# Patient Record
Sex: MALE | Race: WHITE | Employment: FULL TIME
[De-identification: names, ages, dates, MRNs, and addresses within clinical notes are randomized per-mention and may not be internally consistent; named-entity substitution may affect disease eponyms.]

---

## 2022-03-19 PROBLEM — K37 APPENDICITIS: Status: ACTIVE | Noted: 2018-03-05

## 2023-06-19 ENCOUNTER — HOSPITAL ENCOUNTER (OUTPATIENT)
Facility: HOSPITAL | Age: 28
Setting detail: RECURRING SERIES
Discharge: HOME | End: 2023-06-22
Payer: COMMERCIAL

## 2023-06-19 PROCEDURE — 97535 SELF CARE MNGMENT TRAINING: CPT

## 2023-06-19 PROCEDURE — 97161 PT EVAL LOW COMPLEX 20 MIN: CPT

## 2023-06-19 NOTE — PROGRESS NOTES
PHYSICAL / OCCUPATIONAL THERAPY - DAILY TREATMENT NOTE (updated )  For Eval visit    Patient Name: Blanca Peacock    Date: 2023    : 1995  Insurance: Payor: Suhas Lam / Plan: Benedict Leigh / Product Type: *No Product type* /      Patient  verified yes     Visit #   Current / Total 1 6-12   Time   In / Out 4:20 5:05   Pain   In / Out 10 210   Subjective Functional Status/Changes: See POC     TREATMENT AREA =  see POC    OBJECTIVE           35 min   Eval - untimed                      Therapeutic Procedures: Tx Min Billable or 1:1 Min (if diff from Tx Min) Procedure, Rationale, Specifics   10 10 21279 Self Care/Home Management (timed):  improve patient knowledge and understanding of pain reducing techniques, activity modification, diagnosis/prognosis, and physical therapy expectations, procedures and progression  to improve patient's ability to progress to PLOF and address remaining functional goals. (see flow sheet as applicable)     Details if applicable:  see chart copy for HEP.              Details if applicable:            Details if applicable:            Details if applicable:            Details if applicable:     10 10 [facility] Totals Reminder: bill using total billable min of TIMED therapeutic procedures (example: do not include dry needle or estim unattended, both untimed codes, in totals to left)  8-22 min = 1 unit; 23-37 min = 2 units; 38-52 min = 3 units; 53-67 min = 4 units; 68-82 min = 5 units   Total Total     [x]  Patient Education billed concurrently with other procedures   [x] Review HEP    [] Progressed/Changed HEP, detail:    [] Other detail:       Objective Information/Functional Measures/Assessment    See POC    Patient will continue to benefit from skilled PT / OT services to modify and progress therapeutic interventions, analyze and address functional mobility deficits, analyze and address ROM deficits, analyze and address strength deficits, analyze and address soft

## 2023-06-19 NOTE — THERAPY EVALUATION
[address] [place] [address] PHYSICAL THERAPY  [facility] [address], [place], [address]  Phone: [phone]   [phone]  Plan of Care / Statement of Necessity for Physical Therapy Services     Patient Name: Lyla Vides : 1995   Medical   Diagnosis: Left shoulder pain [M25.512] Treatment Diagnosis:  M25.512  LEFT SHOULDER PAIN    Onset Date: 2023     Referral Source: Jacob Moreland PA-C Start of Care [facility]): 2023   Prior Hospitalization: See medical history Provider #: [id number]   Prior Level of Function: RHD; previously able to lift 3-4x/week free weights and machines   Comorbidities: Appendectomy 2018; back pain     Assessment / key information: Pt is a 24 y/o M who presents to PT w/ c/o L shoulder pain, that has been present for about 2 month occurring the day after heavy bench pressing. Pt notes pain ranges 2 to 10/10, made worse with reaching behind back, laying on the L side, pressing/pushing, carrying groceries; better with laying on the back. Pt does endorse some night pain that wakes him up out of a sleep, as well as instability of the L shoulder. Describes pain as aching, sharp, located superior aspect of the L shoulder and occasionally along the anterior aspect of the shoulder. Testing/imaging has included x-ray- unremarkable per pt report. Prior treatment has included nothing to date. . Red flags negative. FOTO 64/100. Clinical Exam Findings:  POSTURE/OBSERVATION: mild L GHJ IR, medial border scap winging on the L with eccentric flexion. C/s AROM mildly limited with R rotation other WNL and pain-free   STRENGTH AROM   Shoulder Left Right Left  (Seated) Right  (Seated)   Flexion 4/5 4+/5 155 156   Extension   48 50   Abduction 4/5 p! 4/5 160 170   ER (scap plane)  4+/5  4/5 60 p! 90   IR (scap plane)  4/5  4+/5 FIR T10 p!  FIR T7   Elbow Left Right Left Right   Extension 4/5 4/5     Flexion 4/5 p! 4/5     Scapular Left Right Left Right   Mid trap 4-/5 4-/5

## 2023-07-05 ENCOUNTER — HOSPITAL ENCOUNTER (OUTPATIENT)
Facility: HOSPITAL | Age: 28
Setting detail: RECURRING SERIES
Discharge: HOME | End: 2023-07-08
Payer: COMMERCIAL

## 2023-07-05 PROCEDURE — 97110 THERAPEUTIC EXERCISES: CPT

## 2023-07-05 PROCEDURE — 97140 MANUAL THERAPY 1/> REGIONS: CPT

## 2023-07-05 NOTE — PROGRESS NOTES
Therapeutic Procedures: Tx Min Billable or 1:1 Min (if diff from Tx Min) Procedure, Rationale, Specifics   26 26 59186 Therapeutic Exercise (timed):  increase ROM, strength, coordination, balance, and proprioception to improve patient's ability to progress to PLOF and address remaining functional goals. (see flow sheet as applicable)     Details if applicable:       10 10 82954 Manual Therapy (timed):  decrease pain, increase ROM, increase tissue extensibility, and decrease trigger points to improve patient's ability to progress to PLOF and address remaining functional goals. The manual therapy interventions were performed at a separate and distinct time from the therapeutic activities interventions . (see flow sheet as applicable)     Details if applicable:  TPR to L infraspinatus, CFM to proximal LHB, inf ghj mob gr IV          Details if applicable:            Details if applicable:     38 39  BC Totals Reminder: bill using total billable min of TIMED therapeutic procedures (example: do not include dry needle or estim unattended, both untimed codes, in totals to left)  8-22 min = 1 unit; 23-37 min = 2 units; 38-52 min = 3 units; 53-67 min = 4 units; 68-82 min = 5 units   Total Total     [x]  Patient Education billed concurrently with other procedures   [x] Review HEP    [] Progressed/Changed HEP, detail:    [] Other detail:       Objective Information/Functional Measures/Assessment    Initiated tx as per flow to improve L shoulder strength/stability. Large TrP present to L infraspinatus with (+) referral to anterior shoulder. Pt challenged with lower trap lifts and posterior RC recruitment. Reviewed HEP and appropriate gym workouts while undergoing PT. Pt verbalized understanding.      Patient will continue to benefit from skilled PT / OT services to modify and progress therapeutic interventions, analyze and address functional mobility deficits, analyze and address ROM deficits, analyze and address

## 2023-07-10 ENCOUNTER — APPOINTMENT (OUTPATIENT)
Facility: HOSPITAL | Age: 28
End: 2023-07-10
Payer: COMMERCIAL

## 2023-07-14 ENCOUNTER — HOSPITAL ENCOUNTER (OUTPATIENT)
Facility: HOSPITAL | Age: 28
Setting detail: RECURRING SERIES
Discharge: HOME | End: 2023-07-17
Payer: COMMERCIAL

## 2023-07-14 PROCEDURE — 97140 MANUAL THERAPY 1/> REGIONS: CPT

## 2023-07-14 PROCEDURE — 97110 THERAPEUTIC EXERCISES: CPT

## 2023-07-14 PROCEDURE — 97112 NEUROMUSCULAR REEDUCATION: CPT

## 2023-07-19 ENCOUNTER — HOSPITAL ENCOUNTER (OUTPATIENT)
Facility: HOSPITAL | Age: 28
Setting detail: RECURRING SERIES
Discharge: HOME | End: 2023-07-22
Payer: COMMERCIAL

## 2023-07-19 PROCEDURE — 97112 NEUROMUSCULAR REEDUCATION: CPT

## 2023-07-19 PROCEDURE — 97110 THERAPEUTIC EXERCISES: CPT

## 2023-07-19 PROCEDURE — 97140 MANUAL THERAPY 1/> REGIONS: CPT

## 2023-07-19 NOTE — PROGRESS NOTES
with 20# for similar goals, patient completed with appropriate scapular mechanics. Patient performance in session indicates good follow through with independent exercises. Patient will continue to benefit from skilled PT / OT services to modify and progress therapeutic interventions, analyze and address functional mobility deficits, analyze and address ROM deficits, analyze and address strength deficits, analyze and address soft tissue restrictions, analyze and cue for proper movement patterns, and analyze and modify for postural abnormalities to address functional deficits and attain remaining goals. Progress toward goals / Updated goals:  []  See Progress Note/Recertification    New Goals to be achieved in __4__ weeks  1. Patient will improve L lower trap strength to 4/5 in order to improve tolerance to weight lifting activities. (update 7/19/23): added 2# weight to prone Y to address goal, patient completed with good form indicating improved strength from IE. 2.   Pt will perform 5 reps body weight push up without pain to enable pt return to PLOF  3.   Improve FOTO score to >/= 78/100 to indicate improved function       Next PN/ RC due 8/14/23  Auth due 1/1/24    PLAN  Yes  Continue plan of care  [x]  Upgrade activities as tolerated  []  Discharge due to :  []  Other:    Alfonso Molina, PT    7/19/2023    5:43 PM    Future Appointments   Date Time Provider [id number]   7/26/2023  4:20 PM Corina Silva PT [facility]

## 2023-07-20 ENCOUNTER — APPOINTMENT (OUTPATIENT)
Facility: HOSPITAL | Age: 28
End: 2023-07-20
Payer: COMMERCIAL

## 2023-07-26 ENCOUNTER — APPOINTMENT (OUTPATIENT)
Facility: HOSPITAL | Age: 28
End: 2023-07-26
Payer: COMMERCIAL

## 2023-07-26 ENCOUNTER — HOSPITAL ENCOUNTER (OUTPATIENT)
Facility: HOSPITAL | Age: 28
Setting detail: RECURRING SERIES
Discharge: HOME | End: 2023-07-29
Payer: COMMERCIAL

## 2023-07-26 PROCEDURE — 97112 NEUROMUSCULAR REEDUCATION: CPT

## 2023-07-26 PROCEDURE — 97110 THERAPEUTIC EXERCISES: CPT

## 2023-07-26 PROCEDURE — 97140 MANUAL THERAPY 1/> REGIONS: CPT

## 2023-07-26 NOTE — PROGRESS NOTES
added core challenge  -progressed prone eccentric ER to prone ER ball toss for improved dynamic stabilization    Pt progressing steadily with PT, reporting good improvement and tolerance to light gym related activity. Plan to continue to progress dynamic stabilization of the L shoulder, gradually introducing weight training as tolerated. Patient will continue to benefit from skilled PT / OT services to modify and progress therapeutic interventions, analyze and address functional mobility deficits, analyze and address ROM deficits, analyze and address strength deficits, analyze and address soft tissue restrictions, analyze and cue for proper movement patterns, and analyze and modify for postural abnormalities to address functional deficits and attain remaining goals. Progress toward goals / Updated goals:  []  See Progress Note/Recertification    New Goals to be achieved in __4__ weeks  1. Patient will improve L lower trap strength to 4/5 in order to improve tolerance to weight lifting activities. (update 7/19/23): added 2# weight to prone Y to address goal, patient completed with good form indicating improved strength from IE. 2.   Pt will perform 5 reps body weight push up without pain to enable pt return to PLOF 7/26/23: progressed to contralateral shoulder tap in modified plank position; plan to progress to table or modified push ups at NV  3. Improve FOTO score to >/= 78/100 to indicate improved function       Next PN/ RC due 8/14/23  Auth due PRISCILA (30 v exp 1/1/24)    PLAN  Yes  Continue plan of care  [x]  Upgrade activities as tolerated  []  Discharge due to :  []  Other:    Newton Schumacher, PT    7/26/2023    4:37 PM    No future appointments.

## 2023-08-02 ENCOUNTER — APPOINTMENT (OUTPATIENT)
Facility: HOSPITAL | Age: 28
End: 2023-08-02
Payer: COMMERCIAL

## 2023-08-03 ENCOUNTER — HOSPITAL ENCOUNTER (OUTPATIENT)
Facility: HOSPITAL | Age: 28
Setting detail: RECURRING SERIES
Discharge: HOME | End: 2023-08-06
Payer: COMMERCIAL

## 2023-08-03 PROCEDURE — 97110 THERAPEUTIC EXERCISES: CPT

## 2023-08-03 PROCEDURE — 97112 NEUROMUSCULAR REEDUCATION: CPT

## 2023-08-03 PROCEDURE — 97140 MANUAL THERAPY 1/> REGIONS: CPT

## 2023-08-03 NOTE — PROGRESS NOTES
PHYSICAL / OCCUPATIONAL THERAPY - DAILY TREATMENT NOTE (updated )    Patient Name: Partha Mascorro    Date: 8/3/2023    : 1995  Insurance: Payor: Elba Garcia / Plan: Yuridia Smith / Product Type: *No Product type* /      Patient  verified Yes     Visit #   Current / Total 6 20   Time   In / Out 1100 1152   Pain   In / Out 5/10 2/10   Subjective Functional Status/Changes: Patient reports he had softball last night and didn't experience any symptoms while playing. 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 TREATMENT AREA =  Left shoulder pain [M25.512]    OBJECTIVE    Modalities Rationale:     decrease inflammation and decrease pain to improve patient's ability to progress to PLOF and address remaining functional goals. min [] Estim Unattended, type/location:                                      []  w/ice    []  w/heat    min [] Estim Attended, type/location:                                     []  w/US     []  w/ice    []  w/heat    []  TENS insruct      min []  Mechanical Traction: type/lbs                   []  pro   []  sup   []  int   []  cont    []  before manual    []  after manual    min []  Ultrasound, settings/location:      min []  Iontophoresis w/ dexamethasone, location:                                               []  take home patch       []  in clinic   10 min  unbill [x]  Ice     []  Heat    location/position: To L shoulder in seated position    min []  Paraffin,  details:     min []  Vasopneumatic Device, press/temp:     min []  Sri Lemos / Homa Acre:     If using vaso (only need to measure limb vaso being performed on)      pre-treatment girth :       post-treatment girth :       measured at (landmark location) :      min []  Other:    Skin assessment post-treatment (if applicable):    [x]  intact    []  redness- no adverse reaction                 []redness - adverse reaction:         Therapeutic

## 2023-08-08 ENCOUNTER — HOSPITAL ENCOUNTER (OUTPATIENT)
Facility: HOSPITAL | Age: 28
Setting detail: RECURRING SERIES
Discharge: HOME | End: 2023-08-11
Payer: COMMERCIAL

## 2023-08-08 PROCEDURE — 97530 THERAPEUTIC ACTIVITIES: CPT

## 2023-08-08 PROCEDURE — 97112 NEUROMUSCULAR REEDUCATION: CPT

## 2023-08-08 PROCEDURE — 97110 THERAPEUTIC EXERCISES: CPT

## 2023-08-08 NOTE — PROGRESS NOTES
PHYSICAL / OCCUPATIONAL THERAPY - DAILY TREATMENT NOTE (updated )    Patient Name: Radha Medeiros    Date: 2023    : 1995  Ins[facility]: [facility] [address] / Plan: Beatriz Paling / Product Type: *No Product type* /      Patient  verified Yes     Visit #   Current / Total 7 20   Time   In / Out 5:41 6:30   Pain   In / Out 2 0   Subjective Functional Status/Changes: See PN     TREATMENT AREA =  Left shoulder pain [M25.512]    OBJECTIVE    Modalities Rationale:     decrease inflammation and decrease pain to improve patient's ability to progress to PLOF and address remaining functional goals. min [] Estim Unattended, type/location:                                      []  w/ice    []  w/heat    min [] Estim Attended, type/location:                                     []  w/US     []  w/ice    []  w/heat    []  TENS insruct      min []  Mechanical Traction: type/lbs                   []  pro   []  sup   []  int   []  cont    []  before manual    []  after manual    min []  Ultrasound, settings/location:      min []  Iontophoresis w/ dexamethasone, location:                                               []  take home patch       []  in clinic   10 min  unbill [x]  Ice     []  Heat    location/position: To L shoulder in seated position    min []  Paraffin,  details:     min []  Vasopneumatic Device, press/temp:     min []  Starleen Malini / Verlon Brightly: If using vaso (only need to measure limb vaso being performed on)      pre-treatment girth :       post-treatment girth :       measured at (landmark location) :      min []  Other:    Skin assessment post-treatment (if applicable):    [x]  intact    []  redness- no adverse reaction                 []redness - adverse reaction:         Therapeutic Procedures:     Tx Min Billable or 1:1 Min (if diff from Tx Min) Procedure, Rationale, Specifics   14 14 83301 Therapeutic Exercise (timed):  increase ROM, strength, coordination, balance, and proprioception to
STRENGTH: Shoulder (L)  Flex 5/5(min pain). ABD 5/5 (min pain). ER 5/5. IR 5/5. Mid trap 5/5, Low trap 4/5. Biceps 5/5 (min pain). Serratus anterior (via dynamometer): R 51.3 lb, L 51.0 lb  SPECIAL TESTS:  (+) Crank (min discomfort in end range IR). (-) empty can, speed's, lift off (crepitus \"feel's good\"), jovanni orosco's  FUNCTIONAL ASSESSMENT:  push up (+) on table with Fair technique; min cues to avoid UT substitution and increase serratus firing   Modified plank shoulder taps x 10 reps with mild scap winging b/l. FOTO 83. GROC +6      New Goals to be achieved in __4__ weeks  1. Patient will improve L lower trap strength to 4/5 in order to improve tolerance to weight lifting activities. Status at last note/certification: 6/28:  4-/5  Current:  8/8: Goal Met; Low trap 4/5  2. Pt will perform 5 reps body weight push up without pain to enable pt return to PLOF   Status at last note/certification: 2/00: n/a  Current:   8/8: Goal in progress: 8/8: performs push up with (+) from 29 inch mat height with 1-2/10 pain x 10 reps  3. Improve FOTO score to >/= 78/100 to indicate improved function  Status at last note/certification: 7/01:  74/100  Current:   8/8: Goal Met:  83      Payor: MINI / Plan: Satya Samuels / Product Type: *No Product type* /     Non-Medicare, can change goals, can adjust or add frequency duration, no signature required      New Goals to be achieved in __4__ weeks  1. Pt will perform 5 reps body weight push up without pain to enable pt return to PLOF   Status at last note/certification: 8/8: performs push up with (+) from 29 inch mat height with 1-2/10  x 10 reps  Current:     2. Pt will report ability to tolerate L sidelying at night with hand under head with pain </= 2/10 for decreased impairment with sleeping. Status at last note/certification: Pain in left sidelying with hand under head. Current:     3.  Pt will increase b/l serratus anterior strength by at

## 2023-08-16 ENCOUNTER — HOSPITAL ENCOUNTER (OUTPATIENT)
Facility: HOSPITAL | Age: 28
Setting detail: RECURRING SERIES
Discharge: HOME | End: 2023-08-19
Payer: COMMERCIAL

## 2023-08-16 PROCEDURE — 97140 MANUAL THERAPY 1/> REGIONS: CPT

## 2023-08-16 PROCEDURE — 97112 NEUROMUSCULAR REEDUCATION: CPT

## 2023-08-16 PROCEDURE — 97110 THERAPEUTIC EXERCISES: CPT

## 2023-08-16 NOTE — PROGRESS NOTES
[] Other detail:       Objective Information/Functional Measures/Assessment  Noted increased tone in L pec major and teres major - decreasing post manual intervention. Demonstrates good scapular stability during prone on SB scapular letters. Pt reports increased ease in raised mat push up and prone ER medicine ball drop during today's visit. Continue to progress shoulder strength and stability per current POC for pain free return to PLOF. Patient will continue to benefit from skilled PT / OT services to modify and progress therapeutic interventions, analyze and address functional mobility deficits, analyze and address ROM deficits, analyze and address strength deficits, analyze and address soft tissue restrictions, analyze and cue for proper movement patterns, analyze and modify for postural abnormalities, and analyze and address imbalance/dizziness to address functional deficits and attain remaining goals. Progress toward goals / Updated goals:  [x]  See Progress Note/Recertification  New Goals to be achieved in __4__ weeks  1. Pt will perform 5 reps body weight push up without pain to enable pt return to PLOF   Status at last note/certification: 8/8: performs push up with (+) from 29 inch mat height with 1-2/10  x 10 reps  Current:     2. Pt will report ability to tolerate L sidelying at night with hand under head with pain </= 2/10 for decreased impairment with sleeping. Status at last note/certification: Pain in left sidelying with hand under head. Current:     3. Pt will increase b/l serratus anterior strength by at least 10 lbs for improved scapular stability with modified shoulder plank taps. Status at last note/certification: Serratus anterior (via dynamometer): R 51.3 lb, L 51.0 lb. Modified plank shoulder taps x 10 reps with mild scap winging b/l.   Current:         Next PN/ RC due 9/8/23  Auth due PRISCILA (30 v ex 1/1/24)    PLAN  Yes  Continue plan of care  [x]  Upgrade activities as

## 2023-08-23 ENCOUNTER — HOSPITAL ENCOUNTER (OUTPATIENT)
Facility: HOSPITAL | Age: 28
Setting detail: RECURRING SERIES
Discharge: HOME | End: 2023-08-26
Payer: COMMERCIAL

## 2023-08-23 PROCEDURE — 97530 THERAPEUTIC ACTIVITIES: CPT

## 2023-08-23 PROCEDURE — 97110 THERAPEUTIC EXERCISES: CPT

## 2023-08-23 NOTE — PROGRESS NOTES
PHYSICAL / OCCUPATIONAL THERAPY - DAILY TREATMENT NOTE (updated )    Patient Name: Duc Ramirez    Date: 2023    : 1995  Insurance: Payor: Celina Fritz / Plan: Melisa Hendricks / Product Type: *No Product type* /      Patient  verified Yes     Visit #   Current / Total 9 20   Time   In / Out 3:41 4:26   Pain   In / Out 0/10 0/10   Subjective Functional Status/Changes: Pt reports he has been back in the gym, feeling some slight discomfort with certain pulling activities, but no more than 2/10 pain-level. TREATMENT AREA =  Left shoulder pain [M25.512]    OBJECTIVE    Modalities Rationale:     decrease inflammation and decrease pain to improve patient's ability to progress to PLOF and address remaining functional goals. min [] Estim Unattended, type/location:                                      []  w/ice    []  w/heat    min [] Estim Attended, type/location:                                     []  w/US     []  w/ice    []  w/heat    []  TENS insruct      min []  Mechanical Traction: type/lbs                   []  pro   []  sup   []  int   []  cont    []  before manual    []  after manual    min []  Ultrasound, settings/location:      min []  Iontophoresis w/ dexamethasone, location:                                               []  take home patch       []  in clinic   10 min  unbill [x]  Ice     []  Heat    location/position: To L shoulder in seated position    min []  Paraffin,  details:     min []  Vasopneumatic Device, press/temp:     min []  Romaine Pouch / Inge Gobble: If using vaso (only need to measure limb vaso being performed on)      pre-treatment girth :       post-treatment girth :       measured at (landmark location) :      min []  Other:    Skin assessment post-treatment (if applicable):    [x]  intact    []  redness- no adverse reaction                 []redness - adverse reaction:         Therapeutic Procedures:     Tx Min Billable or 1:1 Min (if diff from [employer]) Procedure,

## 2023-08-28 ENCOUNTER — HOSPITAL ENCOUNTER (OUTPATIENT)
Facility: HOSPITAL | Age: 28
Setting detail: RECURRING SERIES
Discharge: HOME | End: 2023-08-31
Payer: COMMERCIAL

## 2023-08-28 PROCEDURE — 97530 THERAPEUTIC ACTIVITIES: CPT

## 2023-08-28 PROCEDURE — 97110 THERAPEUTIC EXERCISES: CPT

## 2023-08-28 NOTE — PROGRESS NOTES
[address] PHYSICAL THERAPY  [address] [address] [facility]  Phone: [phone]   [phone]  DISCHARGE SUMMARY  Patient Name: Prince Michele : 1995   Treatment/Medical Diagnosis: Left shoulder pain [M25.512]   Referral Source: [employer]     Date of Initial Visit: 2023 Attended Visits: 10 Missed Visits: 1     SUMMARY OF TREATMENT  Pt has attended 10 PT sessions for left shoulder pain. Pt has met all short term and long term goals with skilled therapy. Patient demonstrates improvement with strength, ROM, and pain levels. Patient demonstrates improvements with UE strength and increasing activity tolerance and performing recreational activities without increase in pain. Patient's subjective and 83 points improvement score indicate functional improvement with skilled therapy. Patient is discharged from skilled physical therapy and transitioned to independent home program at this time. CURRENT STATUS  Goals MET  1. Pt will perform 5 reps body weight push up without pain to enable pt return to PLOF   Status at last note/certification: : performs push up with (+) from 29 inch mat height with 1-2/10  x 10 reps  Current:   23: goal met, 2x5 reps BW push up, pain-free  2. Pt will report ability to tolerate L sidelying at night with hand under head with pain </= 2/10 for decreased impairment with sleeping. Status at last note/certification: Pain in left sidelying with hand under head. Current:   2023: able to lay on the L side without increased pain \"I woke up on my left side this morning\"- goal met  3. Pt will increase b/l serratus anterior strength by at least 10 lbs for improved scapular stability with modified shoulder plank taps. Status at last note/certification: Serratus anterior (via dynamometer): R 51.3 lb, L 51.0 lb. Modified plank shoulder taps x 10 reps with mild scap winging b/l.   Current: MET - no scapular winging

## 2023-08-28 NOTE — PROGRESS NOTES
PHYSICAL / OCCUPATIONAL THERAPY - DAILY TREATMENT NOTE (updated )    Patient Name: Zeferino Cancer    Date: 2023    : 1995  Insurance: Payor: [address] [place] [address] Drive / Plan: Samantha Cui / Product Type: *No Product type* /      Patient  verified Yes     Visit #   Current / Total 10 10   Time   In / Out 5:31pm 6:09pm   Pain   In / Out 0/10 0/10   Subjective Functional Status/Changes: \"I have been working out more and it's a good sore afterwards. \"     TREATMENT AREA =  Left shoulder pain [M25.512]    OBJECTIVE    Therapeutic Procedures: Tx Min Billable or 1:1 Min (if diff from Tx Min) Procedure, Rationale, Specifics   30  33237 Therapeutic Exercise (timed):  increase ROM, strength, coordination, balance, and proprioception to improve patient's ability to progress to PLOF and address remaining functional goals. (see flow sheet as applicable)     Details if applicable:       8  84718 Therapeutic Activity (timed):  use of dynamic activities replicating functional movements to increase ROM, strength, coordination, balance, and proprioception in order to improve patient's ability to progress to PLOF and address remaining functional goals. (see flow sheet as applicable)     Details if applicable:     45  [facility] Totals Reminder: bill using total billable min of TIMED therapeutic procedures (example: do not include dry needle or estim unattended, both untimed codes, in totals to left)  8-22 min = 1 unit; 23-37 min = 2 units; 38-52 min = 3 units; 53-67 min = 4 units; 68-82 min = 5 units   Total Total     [x]  Patient Education billed concurrently with other procedures   [x] Review HEP    [] Progressed/Changed HEP, detail:    [] Other detail:       Objective Information/Functional Measures/Assessment    Pt has attended 10 PT sessions for left shoulder pain. Pt has met all short term and long term goals with skilled therapy. Patient demonstrates improvement with strength, ROM, and pain levels.  Patient's subjective